# Patient Record
Sex: FEMALE | Race: WHITE | NOT HISPANIC OR LATINO | Employment: OTHER | URBAN - METROPOLITAN AREA
[De-identification: names, ages, dates, MRNs, and addresses within clinical notes are randomized per-mention and may not be internally consistent; named-entity substitution may affect disease eponyms.]

---

## 2017-12-07 ENCOUNTER — HOSPITAL ENCOUNTER (EMERGENCY)
Facility: HOSPITAL | Age: 72
Discharge: HOME | End: 2017-12-07
Attending: EMERGENCY MEDICINE
Payer: MEDICARE

## 2017-12-07 VITALS
SYSTOLIC BLOOD PRESSURE: 204 MMHG | DIASTOLIC BLOOD PRESSURE: 93 MMHG | OXYGEN SATURATION: 98 % | TEMPERATURE: 97.4 F | HEART RATE: 63 BPM | RESPIRATION RATE: 16 BRPM

## 2017-12-07 DIAGNOSIS — M54.41 ACUTE RIGHT-SIDED LOW BACK PAIN WITH RIGHT-SIDED SCIATICA: Primary | ICD-10-CM

## 2017-12-07 PROCEDURE — 96372 THER/PROPH/DIAG INJ SC/IM: CPT

## 2017-12-07 PROCEDURE — 99283 EMERGENCY DEPT VISIT LOW MDM: CPT

## 2017-12-07 RX ORDER — METHOCARBAMOL 500 MG/1
500 TABLET, FILM COATED ORAL 2 TIMES DAILY
Qty: 20 TABLET | Refills: 0 | Status: SHIPPED | OUTPATIENT
Start: 2017-12-07

## 2017-12-07 RX ORDER — KETOROLAC TROMETHAMINE 30 MG/ML
30 INJECTION, SOLUTION INTRAMUSCULAR; INTRAVENOUS ONCE
Status: COMPLETED | OUTPATIENT
Start: 2017-12-07 | End: 2017-12-07

## 2017-12-07 RX ORDER — NAPROXEN 500 MG/1
500 TABLET ORAL 2 TIMES DAILY WITH MEALS
Qty: 20 TABLET | Refills: 0 | Status: SHIPPED | OUTPATIENT
Start: 2017-12-07 | End: 2017-12-17

## 2017-12-07 RX ORDER — LIDOCAINE 50 MG/G
1 PATCH TOPICAL DAILY
Qty: 10 PATCH | Refills: 0 | Status: SHIPPED | OUTPATIENT
Start: 2017-12-07

## 2017-12-07 RX ADMIN — KETOROLAC TROMETHAMINE 30 MG: 30 INJECTION, SOLUTION INTRAMUSCULAR at 13:28

## 2017-12-07 NOTE — DISCHARGE INSTRUCTIONS
visits  Carry your medicine list with you in case of an emergency  Follow up with your healthcare provider as directed:  Write down your questions so you remember to ask them during your visits  Manage your symptoms:   · Activity:  Decrease your activity  Do not lift heavy objects or twist your back for at least 6 weeks  Slowly return to your usual activity  · Ice:  Ice helps decrease swelling and pain  Ice may also help prevent tissue damage  Use an ice pack, or put crushed ice in a plastic bag  Cover it with a towel and place it on your low back or leg for 15 to 20 minutes every hour or as directed  · Heat:  Heat helps decrease pain and muscle spasms  Apply heat on the area for 20 to 30 minutes every 2 hours for as many days as directed  · Physical therapy:  You may need to see a physical therapist to teach you exercises to help improve movement and strength, and to decrease pain  An occupational therapist teaches you skills to help with your daily activities  · Use assistive devices if directed: You may need to wear back support, such as a back brace  You may need crutches, a cane, or a walker to decrease stress on your lower back and leg muscles  Ask your healthcare provider for more information about assistive devices and how to use them correctly  Self-care:   · Avoid pressure on your back and legs:  Do not  lift heavy objects, or stand or sit for long periods of time  · Lift objects safely:  Keep your back straight and bend your knees when you  an object  Do not bend or twist your back when you lift  · Maintain a healthy weight:  Ask your healthcare provider how much you should weigh  Ask him to help you create a weight loss plan if you are overweight  · Exercise:  Ask your healthcare provider about the best stretching, warmup, and exercise plan for you  Contact your healthcare provider if:   · You have pain in your lower back at night or when resting      · You have pain in your lower back with numbness below the knee  · You have weakness in one leg only  · You have questions or concerns about your condition or care  Seek care immediately or call 911 if:   · You have trouble holding back your urine or bowel movements  · You have weakness in both legs  · You have numbness in your groin or buttocks  © 2017 [employer] Information is for End User's use only and may not be sold, redistributed or otherwise used for commercial purposes  All illustrations and images included in CareNotes® are the copyrighted property of [employer]  or [employer]  The above information is an  only  It is not intended as medical advice for individual conditions or treatments  Talk to your doctor, nurse or pharmacist before following any medical regimen to see if it is safe and effective for you

## 2017-12-07 NOTE — ED PROVIDER NOTES
History  Chief Complaint   Patient presents with    Back Pain     Lower back pain since doing yard work  Having a hard time walking  No radiation of pain, bowel or bladder problems  68 YO female presents with Right lower back pain for the last 1 5 weeks  Pt states symptoms started gradually since she was working outside, notes this work did require a lot of bending over  Pt states the pain has been aching in quality, worse with movement  She notes associated stiffness  Pain does occasionally radiate down into the Right buttock  Pt states it has become difficult to walk 2/2 the pain, though she notes she has not limited her activities due to the discomfort and she is a generally active person  She denies numbness or weakness, no fevers, no urinary or bowel incontinence  Pt denies CP/SOB/F/C/N/V/D/C, no dysuria, burning on urination or blood in urine  History provided by:  Patient   used: No    Back Pain   Location:  Lumbar spine  Quality:  Aching and stiffness  Stiffness is present:  Unable to specify  Radiates to:  R posterior upper leg  Pain severity:  Moderate  Onset quality:  Gradual  Duration:  1 week  Timing:  Constant  Progression:  Worsening  Chronicity:  New  Context: physical stress    Relieved by:  Nothing  Worsened by: Movement and ambulation  Ineffective treatments:  NSAIDs  Associated symptoms: no abdominal pain, no bladder incontinence, no bowel incontinence, no chest pain, no dysuria, no fever, no numbness, no paresthesias, no tingling and no weakness        None       History reviewed  No pertinent past medical history  History reviewed  No pertinent surgical history  History reviewed  No pertinent family history  I have reviewed and agree with the history as documented      Social History   Substance Use Topics    Smoking status: Current Every Day Smoker    Smokeless tobacco: Never Used    Alcohol use Yes        Review of Systems   Constitutional: Negative for chills, fatigue and fever  HENT: Negative for dental problem  Eyes: Negative for visual disturbance  Respiratory: Negative for shortness of breath  Cardiovascular: Negative for chest pain  Gastrointestinal: Negative for abdominal pain, bowel incontinence, diarrhea and vomiting  Genitourinary: Negative for bladder incontinence, dysuria and frequency  Musculoskeletal: Positive for back pain  Negative for arthralgias  Skin: Negative for rash  Neurological: Negative for dizziness, tingling, weakness, light-headedness, numbness and paresthesias  Psychiatric/Behavioral: Negative for agitation, behavioral problems and confusion  All other systems reviewed and are negative  Physical Exam  ED Triage Vitals [12/07/17 1205]   Temperature Pulse Respirations Blood Pressure SpO2   (!) 97 4 °F (36 3 °C) 79 16 (!) 204/93 99 %      Temp Source Heart Rate Source Patient Position - Orthostatic VS BP Location FiO2 (%)   Temporal Monitor Sitting Right arm --      Pain Score       9           Orthostatic Vital Signs  Vitals:    12/07/17 1205 12/07/17 1326   BP: (!) 204/93    Pulse: 79 63   Patient Position - Orthostatic VS: Sitting        Physical Exam   Constitutional: She is oriented to person, place, and time  She appears well-developed and well-nourished  HENT:   Head: Normocephalic and atraumatic  Eyes: EOM are normal    Neck: Normal range of motion  Cardiovascular: Normal rate, regular rhythm and normal heart sounds  Pulmonary/Chest: Effort normal and breath sounds normal    Abdominal: Soft  There is no tenderness  Musculoskeletal: Normal range of motion  Neurological: She is alert and oriented to person, place, and time  No sensory deficit  She exhibits normal muscle tone  Coordination normal    Skin: Skin is warm and dry  Psychiatric: She has a normal mood and affect  Her behavior is normal  Thought content normal    Nursing note and vitals reviewed        ED Medications  Medications   ketorolac (TORADOL) 30 mg/mL injection 30 mg (30 mg Intramuscular Given 12/7/17 1324)       Diagnostic Studies  Results Reviewed     None                 No orders to display              Procedures  Procedures       Phone Contacts  ED Phone Contact    ED Course  ED Course                                MDM  Number of Diagnoses or Management Options  Acute right-sided low back pain with right-sided sciatica: new and requires workup  Diagnosis management comments: 1  Back pain - Pt with Right paraspinal pain radiating into the Right buttock, she has difficulty with ambulating 2/2 pain but denies weakness or numbness  No red flag symptoms  Will treat with a course of NSAIDs, provide muscle relaxer and lidoderm patches  Pt explained the importance of following with a family doctor as she has not been evaluated in years  Patient Progress  Patient progress: stable    CritCare Time    Disposition  Final diagnoses:   Acute right-sided low back pain with right-sided sciatica     Time reflects when diagnosis was documented in both MDM as applicable and the Disposition within this note     Time User Action Codes Description Comment    12/7/2017  1:08 PM Chacorta Freedman Add [R48 12] Acute right-sided low back pain with right-sided sciatica       ED Disposition     ED Disposition Condition Comment    Discharge  Ronald Sethi discharge to home/self care      Condition at discharge: Stable        Follow-up Information     Follow up With Specialties Details Why [address] Rees  Schedule an appointment as soon as possible for a visit  [place] [address]          Discharge Medication List as of 12/7/2017  1:18 PM      START taking these medications    Details   lidocaine (LIDODERM) 5 % Place 1 patch on the skin daily Remove & Discard patch within 12 hours or as directed by MD, Starting Susanu 12/7/2017, Print      methocarbamol (ROBAXIN) 500 mg tablet Take 1 tablet by mouth 2 (two) times a day, Starting u 12/7/2017, Print      naproxen (NAPROSYN) 500 mg tablet Take 1 tablet by mouth 2 (two) times a day with meals for 10 days, Starting Thu 12/7/2017, Until Sun 12/17/2017, Print           No discharge procedures on file      ED Provider  Electronically Signed by           Carmen Thakkar MD  12/08/17 8831

## 2017-12-12 ENCOUNTER — ALLSCRIPTS OFFICE VISIT (OUTPATIENT)
Dept: OTHER | Facility: OTHER | Age: 72
End: 2017-12-12

## 2017-12-12 DIAGNOSIS — Z13.820 ENCOUNTER FOR SCREENING FOR OSTEOPOROSIS: ICD-10-CM

## 2017-12-12 DIAGNOSIS — Z12.31 ENCOUNTER FOR SCREENING MAMMOGRAM FOR MALIGNANT NEOPLASM OF BREAST: ICD-10-CM

## 2017-12-12 DIAGNOSIS — I65.29 OCCLUSION AND STENOSIS OF UNSPECIFIED CAROTID ARTERY: ICD-10-CM

## 2018-01-03 ENCOUNTER — ALLSCRIPTS OFFICE VISIT (OUTPATIENT)
Dept: OTHER | Facility: OTHER | Age: 73
End: 2018-01-03

## 2018-01-03 ENCOUNTER — GENERIC CONVERSION - ENCOUNTER (OUTPATIENT)
Dept: OTHER | Facility: OTHER | Age: 73
End: 2018-01-03

## 2018-01-04 ENCOUNTER — TRANSCRIBE ORDERS (OUTPATIENT)
Dept: ADMINISTRATIVE | Facility: HOSPITAL | Age: 73
End: 2018-01-04

## 2018-01-04 DIAGNOSIS — J44.9 OBSTRUCTIVE CHRONIC BRONCHITIS WITHOUT EXACERBATION (HCC): Primary | ICD-10-CM

## 2018-01-23 VITALS
WEIGHT: 183.56 LBS | HEIGHT: 67 IN | HEART RATE: 86 BPM | OXYGEN SATURATION: 96 % | TEMPERATURE: 96.7 F | SYSTOLIC BLOOD PRESSURE: 162 MMHG | BODY MASS INDEX: 28.81 KG/M2 | RESPIRATION RATE: 18 BRPM | DIASTOLIC BLOOD PRESSURE: 90 MMHG

## 2018-01-24 VITALS
TEMPERATURE: 97.5 F | WEIGHT: 182.25 LBS | OXYGEN SATURATION: 98 % | DIASTOLIC BLOOD PRESSURE: 80 MMHG | HEIGHT: 67 IN | HEART RATE: 84 BPM | RESPIRATION RATE: 18 BRPM | BODY MASS INDEX: 28.61 KG/M2 | SYSTOLIC BLOOD PRESSURE: 134 MMHG

## 2018-01-30 NOTE — MISCELLANEOUS
Assessment   1  Hyperlipidemia, unspecified hyperlipidemia type (272 4) (E78 5)1   2  Carotid stenosis, symptomatic w/o infarct, unspecified laterality (433 10) (I65 29)1   3  Benign essential hypertension (401 1) (I10)1   4  Lung nodule (793 11) (R91 1)2   5  COPD (chronic obstructive pulmonary disease) (496) (J44 9)2   6  Chronic coronary artery disease (414 00) (I25 10)2   7  Diastolic dysfunction (047 5) (I51 9)2      1 Amended By: Juline Phalen; Jan 03 2018 2:40 PM EST   2 Amended By: Juline Phalen; Jan 03 2018 4:06 PM EST    Discussion/Summary  Discussion Summary: At the last visit patient adamantly refused any blood pressure medication  Today she states she will continue to take the medications she was prescribed at the hospital not only for her high blood pressure but for her cholesterol as well as the aspirin and Plavix as long as she feels they are not affecting her functioning  Patient states she has enough refills of all medications and does not wish any medications to be sent to the pharmacy at this time  Patient states her daughter would like her to follow up with Pulmonary and Cardiology, I offered to facilitate making the appointments for her however she states she would rather make the appointments herself  She was given the information including phone number for [facility] pulmonary and cardiology  Pulmonary function test were also ordered however patient states she would rather for she Pulmonary before getting them done  She repeated multiple times she just wants to avoid having more testing and necessary and feels that more often than not testing is not truly necessary  She agrees to have carotid ultrasound done  Phone number and information was given so she can make your appointment to have the carotid ultrasound done at her convenience  [facility] from [facility] were reviewed   Room  Patient currently refusing any treatment for COPD as she states at this time she does not have any shortness of breath and does not see the need for medication  1    Medication SE Review and Pt Understands Tx: Possible side effects of new medications were reviewed with the patient/guardian today1  The treatment plan was reviewed with the patient/guardian  The patient/guardian understands and agrees with the treatment plan1    Self Referrals:   Self Referrals: No1        1 Amended By: Karthik Payne; Jan 03 2018 4:17 PM EST    Chief Complaint  Chief Complaint Free Text Note Form: Patient presents today to [facility] from [facility] in [place] for Pneumonia, pt states she feels better today  1        1 Amended By: Neeraj Farmer; Jan 03 2018 2:11 PM EST    History of Present Illness  TCM Communication [facility]: The patient is being contacted for follow-up after hospitalization  She was hospitalized at [facility]  The dates of hospitalization:, date of admission: 12/25/2017, date of discharge: 12/30/2017  Diagnosis: PNEUMONIA / SEPSIS  She was discharged to home  Medications were not reviewed today  She scheduled a follow up appointment  Follow-up appointments with other specialists: PCP [facility] 01/03/2018 @2PM  The patient is currently asymptomatic  Counseling was provided to patient's family  PTS DAUGHTER KARISSA  Communication performed and completed by Collette Betancourt MA   HPI: 76-year-old female seen once in the office before admitted to hospital in [place] on December 25, 2017  Patient was visiting her daughter in [place] when she started feeling short of breath  She was transported via EMS  She required intubation in the emergency department  Full workup during hospital stay revealed a pneumonia and sepsis  She was also found to have COPD exacerbation, pulmonary edema and a 5 mm lung nodule  ECHO revealed an ejection fraction of approximately 60-65 percent, grade 1 diastolic dysfunction and mild-to-moderate LVH  She received IV antibiotics and was discharged home    She is here today for follow-up  She finished course of antibiotics with Cefdinir   Her main concern today is profuse diarrhea  She states she has 3-4 episodes of watery diarrhea a day  Diarrhea has slowly started to improve since she finished her antibiotics  She denies any abdominal pain, nausea, vomiting, change in appetite  She denies any shortness of breath, cough or chest pain  She does complain of feeling fatigue described as less energy as she usually has  She also complains of low back pain  As per patient her low back pain had improved after she last saw me  Low back pain restarted while she was in the hospital  Low back pain is constant radiated towards her right hip and buttock  Worse when lying down or bending over  Improve slightly with local heat application and sitting upright on a chair with lumbar support  She denies any weakness, numbness, tingling of her lower extremities  Patient states approximately 12 years ago she had surgery for carotid stenosis  At that time before her surgery she would often feel dizziness with numbness and tingling of her right arm  She currently states she started having similar symptoms with feeling off balance and dizzy if she turns her head towards the right, worse a tight scarf or turtle neck  1        1 Amended By: Barbaraann Gaucher; Jan 03 2018 3:58 PM EST    Review of Systems  Complete-Female:   Constitutional:1  feeling tired2                1,2 , but no fever2 , not feeling poorly2 , no recent weight gain2 , no chills2  and no recent weight loss2   Eyes:1  No complaints of eye pain, no red eyes, no eyesight problems, no discharge, no dry eyes, no itching of eyes1   ENT:1  no complaints of earache, no loss of hearing, no nose bleeds, no nasal discharge, no sore throat, no hoarseness1   Cardiovascular:1  No complaints of slow heart rate, no fast heart rate, no chest pain, no palpitations, no leg claudication, no lower extremity edema1      Respiratory:1  No complaints of shortness of breath, no wheezing, no cough, no SOB on exertion, no orthopnea, no PND1   Gastrointestinal: diarrhea1 , but no abdominal pain2 , no nausea2 , no vomiting2 , no constipation2  and no blood in stools2   Genitourinary:1  No complaints of dysuria, no incontinence, no pelvic pain, no dysmenorrhea, no vaginal discharge or bleeding1   Musculoskeletal:1  as noted in [place] Elizabeth   Integumentary:2  No complaints of skin rash or lesions, no itching, no skin wounds, no breast pain or lump2   Psychiatric:2  Not suicidal, no sleep disturbance, no anxiety or depression, no change in personality, no emotional problems2         1 Amended By: Be Vang; Jan 03 2018 2:40 PM EST   2 Amended By: Be Vang; Jan 03 2018 4:03 PM EST    Active Problems   1  Acute right-sided low back pain with right-sided sciatica (724 2,724 3) (M54 41)  2  Benign essential hypertension (401 1) (I10)  3  Colon cancer screening (V76 51) (Z12 11)  4  Encounter for screening mammogram for breast cancer (V76 12) (Z12 31)  5  Parotitis (527 2) (K11 20)  6  Screening for osteoporosis (V82 81) (Z13 820)    Family History  Mother   1  Denied: Family history of substance abuse  Father   2  Denied: Family history of substance abuse  Brother   3  Family history of dementia (V17 2) (Z81 8)  4  Family history of malignant neoplasm (V16 9) (Z80 9)    Social History    · Current every day smoker (305 1) (F17 200)   · No drug use   · Social alcohol use (Z78 9)    Current Meds  1  Methocarbamol 500 MG Oral Tablet; take 1 tablet by mouth twice a day; Therapy: 65Bqr3273 to (Evaluate:41Yye6141)  Requested for: 49Lny9617; Last   Rx:[id number] Ordered  2  Naproxen 500 MG Oral Tablet; TAKE 1 TABLET EVERY 12 HOURS AS NEEDED; Therapy: 76Dvz6213 to (Evaluate:61Wrm3832)  Requested for: 29Pfu1807; Last   Rx:[id number] Ordered    Allergies   1   Influenza Virus Vaccine Whole    Physical Exam    Constitutional1    General appearance: No acute distress, well appearing and well nourished  1    Pulmonary1    Respiratory effort: No increased work of breathing or signs of respiratory distress  1    Auscultation of lungs: Clear to auscultation  1    Cardiovascular1    Auscultation of heart: Normal rate and rhythm, normal S1 and S2, without murmurs  1    Examination of extremities for edema and/or varicosities: Normal 1    Abdomen1    Abdomen: Non-tender, no masses  1    Liver and spleen: No hepatomegaly or splenomegaly  1    Musculoskeletal1    Gait and station: Abnormal  1  Gait evaluation demonstrated antalgia bilaterally and spasticity bilaterally  1,2    Digits and nails: Abnormal  2  Examination of the extremities revealed2  Raynaud's phenomenon2   Inspection/palpation of joints, bones, and muscles: Abnormal  1  Appearance - normal  Palpation - normal except as noted: right mid-lumbar and right lower lumbar tenderness  Psychiatric1    Orientation to person, place, and time: Normal 1    Mood and affect: Normal 1          1 Amended By: Johnny Rocha; Jan 03 2018 2:40 PM EST   2 Amended By: Johnny Rocha; Jan 03 2018 4:04 PM EST    Message   Recorded as Task   Date: 01/02/2018 09:46 AM, Created By: Melvi Haley   Task Name: Follow Up   Assigned To: Suyapa Brooks   Regarding Patient: Elizabeth Almaguer, Status: Active   Comment:    Melvi Haley - 02 Jan 2018 9:46 AM     TASK CREATED  Caller: KARISSA, Adult Child; Other; [phone] (Day)  [place] [place]  [place] 12/30/17    PNEUMONIA  SEPSIS    TUTU APPT SCHED 1/03/18 2PM CF    ANY QUESTIONS PLEASE CALL DAUGHTERKARISSA      NEEDS TUTU NOTE     Future Appointments    Date/Time Provider Specialty Site   01/03/2018 03:00 PM REGAN Witt  Pulmonary Medicine [facility] [place]   01/03/2018 02:00 PM REGAN Landrum  [address] [place] [address]   01/12/2018 01:00 PM REGAN Bowden   Cardiology  CARDIOLOGY  [place]   Electronically signed by : Tatyana Andrade, ; Jan 2 2018 10:40AM EST                       (Author)    Electronically signed by : REGAN Diggs ; Jan 2 2018  3:59PM EST                          Electronically signed by : REGAN Diggs ; [place]  3 2018  4:17PM EST                       (Author)